# Patient Record
(demographics unavailable — no encounter records)

---

## 2024-10-23 NOTE — HISTORY OF PRESENT ILLNESS
[FreeTextEntry1] : 73 with left ovarian cyst and elevated . Pt with c/o vaginal discharge and external irritation. The MRI on 8/21/24 shows the ovarian cysts without any enhancement or solid areas, but liver lesions with enhancement and biopsy is recommended given elevated .  Referral to IR for evaluation of imaging to determine if guided biopsy possible or needs to be excisional biopsy by surgical oncology. Referral also done to Dr. Angel for follow up on liver lesion. Pending work up of liver lesion will avoid surgical discussion as ovarian cyst likely benign in absence of solid elements or concerning features. Endometrial biopsy also benign. If surgical intervention is planned, then would like to perform left oophorectomy, bilateral salpingectomy at the time of the procedure.  10/4/24: Final Diagnosis Liver lesion, biopsy: Liver parenchyma with area of thick fibrosis, compatible with cyst wall.

## 2024-10-23 NOTE — PHYSICAL EXAM
[Chaperone Present] : A chaperone was present in the examining room during all aspects of the physical examination [33678] : A chaperone was present during the pelvic exam. [FreeTextEntry2] : Kayla Jackson MA was present the entire duration of the patient interaction and gynecological exam. [Normal] : Mood and affect: Normal [Restricted in physically strenuous activity but ambulatory and able to carry out work of a light or sedentary nature] : Status 1- Restricted in physically strenuous activity but ambulatory and able to carry out work of a light or sedentary nature, e.g., light house work, office work

## 2024-10-23 NOTE — REASON FOR VISIT
[FreeTextEntry1] : [facility] [place]. [phone] [address] [place] [address]

## 2024-10-23 NOTE — END OF VISIT
[FreeTextEntry3] : TRH, BSO, cysto - complex ovarian cyst, Elevated  Coordinate with Dr. Angel combined surgery Pre-surgical testing, CBC, BMP, LFTs, , , CEA, PCP clearance, ECG, type and screen [FreeTextEntry2] : Kayla Jackson MA was present the entire duration of the patient interaction and gynecological exam.

## 2024-10-23 NOTE — REASON FOR VISIT
[FreeTextEntry1] : [facility] [place]. [phone] [address] [place] [address]

## 2024-10-23 NOTE — PHYSICAL EXAM
[Chaperone Present] : A chaperone was present in the examining room during all aspects of the physical examination [04470] : A chaperone was present during the pelvic exam. [FreeTextEntry2] : Kayla Jackson MA was present the entire duration of the patient interaction and gynecological exam. [Normal] : Mood and affect: Normal [Restricted in physically strenuous activity but ambulatory and able to carry out work of a light or sedentary nature] : Status 1- Restricted in physically strenuous activity but ambulatory and able to carry out work of a light or sedentary nature, e.g., light house work, office work

## 2024-10-23 NOTE — ASSESSMENT
[FreeTextEntry1] : Pt is a 74 yo with a left ovarian cyst and elevate . On following continues to increase now 557. The liver was biopsied but likely non-diagnostic. I do not think the ovarian cyst explains this elevation in  and I am concerned about an occult upper GI malignancy. She khang

## 2024-10-23 NOTE — HISTORY OF PRESENT ILLNESS
Detail Level: Detailed [FreeTextEntry1] : 73 with left ovarian cyst and elevated . Pt with c/o vaginal discharge and external irritation. The MRI on 8/21/24 shows the ovarian cysts without any enhancement or solid areas, but liver lesions with enhancement and biopsy is recommended given elevated .  Referral to IR for evaluation of imaging to determine if guided biopsy possible or needs to be excisional biopsy by surgical oncology. Referral also done to Dr. Angel for follow up on liver lesion. Pending work up of liver lesion will avoid surgical discussion as ovarian cyst likely benign in absence of solid elements or concerning features. Endometrial biopsy also benign. If surgical intervention is planned, then would like to perform left oophorectomy, bilateral salpingectomy at the time of the procedure.  10/4/24: Final Diagnosis Liver lesion, biopsy: Liver parenchyma with area of thick fibrosis, compatible with cyst wall. Hide Additional Notes?: No

## 2024-11-04 NOTE — DISCUSSION/SUMMARY
[FreeTextEntry1] : MAMADOU YU is a 73 year old F who presents today Nov 04, 2024 with the above history and the following active issues:  Preoperative cardiovascular examination. Patient may proceed with a total hysterectomy and she also states a part of her liver is being removed robotically, as well as visualization of her pancreas 11/19/24 as well as colonoscopy this Friday 11/8/24.  At present, there are no active cardiac conditions. No recent unstable coronary syndromes, decompensated heart failure, severe valvular heart disease or significant dysrhythmias.   Baseline functional status is acceptable/limited.     The clinical benefit of the proposed procedure outweighs the associated cardiovascular risk.   Risk not attenuated with further CV testing.   Prior testing as outlined above. Optimized from a cardiovascular perspective. Minimize time off ASA Continue beta blocker DVT ppx   LBBB CAD nonobx no angina normal perfusion on nuclear stress test normal LVEF cont med rx - ASA, statin, BBl advised to call if any new symptoms.  HTN: Well controlled. will continue current regimen. Educated pt on importance of healthy diet and exercise. monitor electrolytes on combination diuretic therapy (aldactone and chlorthalidone). On K supp. On Amlodipine. ** previous allergic reaction to ACE inhibitor (angioedema)  Hyperlipidemia Ca score of 643. TIA continue aspirin BB and statin therapy.  Diabetes: metformin. Managed by endocrinologist.  Ongoing f/u with PCP.  F/U 12/2024 as previously planned with Dr. [place] Discussed red flag symptoms, which would warrant sooner or emergent medical evaluation. Any questions and concerns were addressed and resolved.  Sincerely, Kaylynn Drew [facility] Patient's history, testing, and plan was reviewed with supervising physician, Dr. Dami Emerson

## 2024-11-04 NOTE — HISTORY OF PRESENT ILLNESS
[FreeTextEntry1] : MAMADOU YU is a 73 year old female with a past medical history of  h/o LBBB, TIA, long standing HTN, HLD, LEWIS , DM, and edema. Retired Nurse.  There is no prior history of myocardial infarction, coronary revascularization, rheumatic heart disease, congestive heart failure, or atrial fibrillation.  At baseline her functional status is limited. She has significant lower extremity musculoskeletal pain.   Last seen 6/18/24. In the interim she had an MRI that was abnormal. This prompted a liver biopsy which was reportedly negative. She reports her tumor marker tests were elevated. She is scheduled for a total hysterectomy and she also states a part of her liver is being removed robotically, as well as visualization of her pancreas 11/19/24. Also has a colonoscopy this Friday 11/8/24. She denies chest pain, pressure, palpitations, unusual shortness of breath, orthopnea, LE edema, lightheadedness, dizziness, near syncope or syncope. Former smoker. Not active due to orthopedic issues.  Testing:  EKG 11/4/24: SR, KY interval 168 ms, QTc 443 ms, at 71 bpm with LBBB  EKG 6/18/24 normal sinus rhythm LBBB  Echo 6/2024 EF 55% mild TR, septal motion c/w LBBB  Nuclear stress test 6/2024: Conclusions: 1. Baseline electrocardiogram: normal sinus rhythm with left bundle branch block. 2. The patient underwent stress testing using pharmacological IV regadenoson (bolus injection, 400mcg  over 10 to 20 seconds followed by 5ml flush) protocol. - The total procedure time was 4 minutes . The test was stopped due to completion of protocol. - The peak heart rate was 81 bpm: 55 % of the patient's predicted maximal heart rate. The heart rate  response was normal pharmocologic heart rate response. - There was a hypotensive pharmocologic blood pressure response with a maximum blood pressure of  90/54 mmHg. 3. Arrhythmias: Single VPD's occurred during stress. 4. The ECG is nondiagnostic for exercise induced ischemia due to LBBB.  5. Normal myocardial perfusion scan, with no evidence of infarction or inducible ischemia.  Labs 2/2024 results have a hemoglobin 13.5 creatinine 0.58 total cholesterol 158 LDL 76 CPK 39 A1c 7.1  blood work December 2022  LP(a) 189  cardiac CTA 11/2021 calcium score 643 nonobstructive CAD  monitor 11/2021 demonstrates sinus rhythm average heart rate 72 rare ectopy no sustained dysrhythmias  Carotids 6/30/20: LICA velocities and ratio suggestive of <50% stenosis. LOWELL has mild nonobstructive  atherosclerosis.  Abd u/s 6/30/20: There is no evidence of AAA. Mild plaque.  Prior cardiac catheterization in 2006 with normal coronary arteries.

## 2024-12-04 NOTE — REASON FOR VISIT
[Post Op] : post op visit [de-identified] : 11/19/24 [de-identified] : 1) Robotic assisted laparoscopic hysterectomy, bilateral salpingo-oophorectomy 2) Uterosacral ligament fixation 3) Cystoscopy [de-identified] : Patient has recovered well from her surgery, Denies any SOB, abnormal pain or VB. Bowel and bladder function are wnl. Patient states she feels well from a gynecological stand point.

## 2024-12-04 NOTE — REASON FOR VISIT
[Post Op] : post op visit [de-identified] : 11/19/24 [de-identified] : 1) Robotic assisted laparoscopic hysterectomy, bilateral salpingo-oophorectomy 2) Uterosacral ligament fixation 3) Cystoscopy [de-identified] : Patient has recovered well from her surgery, Denies any SOB, abnormal pain or VB. Bowel and bladder function are wnl. Patient states she feels well from a gynecological stand point.

## 2024-12-04 NOTE — DISCUSSION/SUMMARY
[Clean] : was clean [Dry] : was dry [Intact] : was intact [Erythema] : was not erythematous [Ecchymosis] : was not ecchymotic [Seroma] : had no seroma [None] : had no drainage [Normal Skin] : normal appearance [Firm] : soft [Tender] : nontender [Abnormal Bowel Sounds] : normal bowel sounds [Rebound] : no rebound tenderness [Guarding] : no guarding [Incisional Hernia] : no incisional hernia [Mass] : no palpable mass [Doing Well] : is doing well [Excellent Pain Control] : has excellent pain control [No Sign of Infection] : is showing no signs of infection [FreeTextEntry1] : Pertinent Findings: Please see Dr. Angel's surgery for first part of the procedure and liver cyst findings. Uterus sounded to 8 cm. Small atrophic and enlarged bilateral ovaries with 5 cm left paratubal complex cyst. No concern for malignancy. Extensive sigmoid diverticular disease without active inflammation or perforation. No evidence of intra-peritoneal disease and normal upper abdomen. Cystoscopy: bilateral ureteral jets. No injury to the bladder. No lesions or masses.  Final Diagnosis 1. Peritoneal cyst, cystectomy: -  Organizing blood clot, scar tissue -  Benign mesothelial type cyst   2. Uterus, cervix, bilateral fallopian tubes and ovaries; total hysterectomy with bilateral salpingo-oopherectomy: -  Benign endometrial polyp -  Adenomyosis, leiomyoma -  Cervix with nabothian cyst -  Bilateral fallopian tubes, unremarkable -  Bilateral ovaries with inclusion cysts and calcifications -  Benign paraovarian cyst

## 2024-12-04 NOTE — END OF VISIT
[FreeTextEntry3] : RTC in 4 weeks for cuff check [FreeTextEntry2] : Viji Garner MA was present the entire duration of the patient interaction and gynecological exam.

## 2024-12-04 NOTE — ASSESSMENT
[FreeTextEntry1] : Pt is a 73 s/p TRH, BSO with adenomyosis on 11/25/24. She is recovering well. We discussed findings of diverticular disease on the sigmoid colon which was quite extensive and missed on the colonoscopy. I will ask them to follow up with a colorectal doctor and consider repeating colonoscopy with them in the future and manage as appropriate. Patient overall asymptomatic.

## 2024-12-17 NOTE — HISTORY OF PRESENT ILLNESS
[FreeTextEntry1] : MAMADOU YU  is a 73 year old  F She was last seen June 2024 and interim CT describes atherosclerotic changes of the vessels last EKG demonstrates sinus rhythm left bundle branch block interim visit preoperative November 2024 underwent hysterectomy BSO November 2024 A1c 6.9 creatinine 0.6 hemoglobin 13.6 atorvastatin has been refilled follow-up ultrasound imaging has been requested.  Underwent GYN surgery.  Blood pressure maintenance well-controlled. h/o LBBB, TIA, long standing HTN, HLD, LEWIS , DM, and edema. 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  She has significant lower extremity musculoskeletal pain.   No exertional chest pain, pressure, palpitations, unusual shortness of breath, orthopnea, LE edema, lightheadedness, dizziness, near syncope or syncope.  EKG 6/18/24 normal sinus rhythm LBBB Echo 6/2024 EF 55% mild TR, septal motion c/w LBBB Nuclear stress test 6/2024 normal LV systolic function  Last blood work results have a hemoglobin 13.5 creatinine 0.6 total cholesterol 158 LDL 76 CPK 39 A1c 7.1 blood work December 2022  LP(a) 189  cardiac CTA 11/2021 calcium score 643 nonobstructive CAD monitor 11/2021 demonstrates sinus rhythm average heart rate 72 rare ectopy no sustained dysrhythmias Carotids 6/30/20: LICA velocities and ratio suggestive of <50% stenosis. LOWELL has mild nonobstructive atherosclerosis. Abd u/s 6/30/20: There is no evidence of AAA. Mild plaque. Prior cardiac catheterization in 2006 with normal coronary arteries.  LBBB CAD  normal perfusion on nuclear stress test  normal LVEF ASA, statin, BBl advised to call if any new symptoms.   HTN:  Well controlled.  will continue current regimen.  Educated pt on importance of healthy diet and exercise.  monitor electrolytes on combination diuretic therapy (aldactone and chlorthalidone). ** previous allergic reaction to ACE inhibitor (angioedema)  Hyperlipidemia Ca score of 643.   TIA aspirin BB and statin therapy.  Diabetes:  metformin. Managed by endocrinologist.  Discussed red flag symptoms, which would warrant sooner or emergent medical evaluation. Any questions and concerns were addressed and resolved.

## 2025-01-22 NOTE — DISCUSSION/SUMMARY
[Clean] : was clean [Dry] : was dry [Intact] : was intact [Erythema] : was not erythematous [Ecchymosis] : was not ecchymotic [Seroma] : had no seroma [None] : had no drainage [Normal Skin] : normal appearance [Firm] : soft [Tender] : nontender [Abnormal Bowel Sounds] : normal bowel sounds [Rebound] : no rebound tenderness [Guarding] : no guarding [Incisional Hernia] : no incisional hernia [Mass] : no palpable mass [External Genitalia Abnormal] : normal external genitalia [Vaginal Exam Abnormal] : normal vaginal exam [Doing Well] : is doing well [Excellent Pain Control] : has excellent pain control [No Sign of Infection] : is showing no signs of infection [de-identified] : cuff well healed [FreeTextEntry1] :  Pertinent Findings: Please see Dr. Angel's surgery for first part of the procedure and liver cyst findings. Uterus sounded to 8 cm. Small atrophic and enlarged bilateral ovaries with 5 cm left paratubal complex cyst. No concern for malignancy. Extensive sigmoid diverticular disease without active inflammation or perforation. No evidence of intra-peritoneal disease and normal upper abdomen. Cystoscopy: bilateral ureteral jets. No injury to the bladder. No lesions or masses.  Final Diagnosis 1. Peritoneal cyst, cystectomy: - Organizing blood clot, scar tissue - Benign mesothelial type cyst   2. Uterus, cervix, bilateral fallopian tubes and ovaries; total hysterectomy with bilateral salpingo-oopherectomy: - Benign endometrial polyp - Adenomyosis, leiomyoma - Cervix with nabothian cyst - Bilateral fallopian tubes, unremarkable - Bilateral ovaries with inclusion cysts and calcifications - Benign paraovarian cyst.

## 2025-01-22 NOTE — ASSESSMENT
[FreeTextEntry1] : Pt is a 74 yo s/p TRH, BSO on 11/19/24. Pathology benign and consistent with adenomyosis. Recovered well. Vaginal cuff well healed.

## 2025-01-22 NOTE — REASON FOR VISIT
[Other ___] : [unfilled] [de-identified] : 11/19/24 [de-identified] : 1) Robotic assisted laparoscopic hysterectomy, bilateral salpingo-oophorectomy 2) Uterosacral ligament fixation 3) Cystoscopy  [de-identified] : Patient has recovered well from her procedure. She reports doing great and has no concerns. No fevers/chills, no issues with bowel or bladder function. She has follow up with GI and requests pathology and surgical reports.

## 2025-01-22 NOTE — REASON FOR VISIT
[Other ___] : [unfilled] [de-identified] : 11/19/24 [de-identified] : 1) Robotic assisted laparoscopic hysterectomy, bilateral salpingo-oophorectomy 2) Uterosacral ligament fixation 3) Cystoscopy  [de-identified] : Patient has recovered well from her procedure. She reports doing great and has no concerns. No fevers/chills, no issues with bowel or bladder function. She has follow up with GI and requests pathology and surgical reports.

## 2025-01-22 NOTE — ASSESSMENT
[FreeTextEntry1] : Pt is a 72 yo s/p TRH, BSO on 11/19/24. Pathology benign and consistent with adenomyosis. Recovered well. Vaginal cuff well healed.

## 2025-01-22 NOTE — END OF VISIT
[FreeTextEntry3] : Discharge to routine gynecologic and preventative care [FreeTextEntry2] : Viji Garner MA was present the entire duration of the patient interaction and gynecological exam.

## 2025-01-22 NOTE — DISCUSSION/SUMMARY
[Clean] : was clean [Dry] : was dry [Intact] : was intact [Erythema] : was not erythematous [Ecchymosis] : was not ecchymotic [Seroma] : had no seroma [None] : had no drainage [Normal Skin] : normal appearance [Firm] : soft [Tender] : nontender [Abnormal Bowel Sounds] : normal bowel sounds [Rebound] : no rebound tenderness [Guarding] : no guarding [Incisional Hernia] : no incisional hernia [Mass] : no palpable mass [External Genitalia Abnormal] : normal external genitalia [Vaginal Exam Abnormal] : normal vaginal exam [Doing Well] : is doing well [Excellent Pain Control] : has excellent pain control [No Sign of Infection] : is showing no signs of infection [de-identified] : cuff well healed [FreeTextEntry1] :  Pertinent Findings: Please see Dr. Angel's surgery for first part of the procedure and liver cyst findings. Uterus sounded to 8 cm. Small atrophic and enlarged bilateral ovaries with 5 cm left paratubal complex cyst. No concern for malignancy. Extensive sigmoid diverticular disease without active inflammation or perforation. No evidence of intra-peritoneal disease and normal upper abdomen. Cystoscopy: bilateral ureteral jets. No injury to the bladder. No lesions or masses.  Final Diagnosis 1. Peritoneal cyst, cystectomy: - Organizing blood clot, scar tissue - Benign mesothelial type cyst   2. Uterus, cervix, bilateral fallopian tubes and ovaries; total hysterectomy with bilateral salpingo-oopherectomy: - Benign endometrial polyp - Adenomyosis, leiomyoma - Cervix with nabothian cyst - Bilateral fallopian tubes, unremarkable - Bilateral ovaries with inclusion cysts and calcifications - Benign paraovarian cyst.